# Patient Record
Sex: MALE | Race: WHITE | Employment: UNEMPLOYED | URBAN - METROPOLITAN AREA
[De-identification: names, ages, dates, MRNs, and addresses within clinical notes are randomized per-mention and may not be internally consistent; named-entity substitution may affect disease eponyms.]

---

## 2022-09-16 ENCOUNTER — HOSPITAL ENCOUNTER (EMERGENCY)
Age: 57
Discharge: HOME | End: 2022-09-16
Attending: EMERGENCY MEDICINE

## 2022-09-16 ENCOUNTER — APPOINTMENT (OUTPATIENT)
Dept: GENERAL RADIOLOGY | Age: 57
End: 2022-09-16

## 2022-09-16 VITALS
HEIGHT: 64 IN | BODY MASS INDEX: 17.93 KG/M2 | WEIGHT: 105 LBS | SYSTOLIC BLOOD PRESSURE: 113 MMHG | OXYGEN SATURATION: 93 % | TEMPERATURE: 98.6 F | DIASTOLIC BLOOD PRESSURE: 80 MMHG | HEART RATE: 78 BPM | RESPIRATION RATE: 18 BRPM

## 2022-09-16 DIAGNOSIS — J44.1 COPD EXACERBATION (HCC): Primary | ICD-10-CM

## 2022-09-16 DIAGNOSIS — R09.02 HYPOXIA: ICD-10-CM

## 2022-09-16 LAB
ALBUMIN SERPL-MCNC: 3.2 G/DL (ref 3.5–5)
ALBUMIN/GLOB SERPL: 0.8 {RATIO} (ref 1.2–3.5)
ALP SERPL-CCNC: 79 U/L (ref 50–136)
ALT SERPL-CCNC: 21 U/L (ref 12–65)
ANION GAP SERPL CALC-SCNC: 2 MMOL/L (ref 4–13)
ARTERIAL PATENCY WRIST A: POSITIVE
AST SERPL-CCNC: 10 U/L (ref 15–37)
BASE EXCESS BLD CALC-SCNC: 2.7 MMOL/L
BASOPHILS # BLD: 0 K/UL (ref 0–0.2)
BASOPHILS NFR BLD: 0 % (ref 0–2)
BDY SITE: ABNORMAL
BILIRUB SERPL-MCNC: 0.4 MG/DL (ref 0.2–1.1)
BUN SERPL-MCNC: 19 MG/DL (ref 6–23)
CALCIUM SERPL-MCNC: 9.6 MG/DL (ref 8.3–10.4)
CHLORIDE SERPL-SCNC: 102 MMOL/L (ref 101–110)
CO2 BLD-SCNC: 29 MMOL/L (ref 13–23)
CO2 SERPL-SCNC: 33 MMOL/L (ref 21–32)
CREAT SERPL-MCNC: 1 MG/DL (ref 0.8–1.5)
D DIMER PPP FEU-MCNC: 0.89 UG/ML(FEU)
DIFFERENTIAL METHOD BLD: ABNORMAL
EKG ATRIAL RATE: 94 BPM
EKG DIAGNOSIS: NORMAL
EKG P AXIS: 82 DEGREES
EKG P-R INTERVAL: 138 MS
EKG Q-T INTERVAL: 352 MS
EKG QRS DURATION: 88 MS
EKG QTC CALCULATION (BAZETT): 440 MS
EKG R AXIS: 189 DEGREES
EKG T AXIS: 78 DEGREES
EKG VENTRICULAR RATE: 94 BPM
EOSINOPHIL # BLD: 0.2 K/UL (ref 0–0.8)
EOSINOPHIL NFR BLD: 2 % (ref 0.5–7.8)
ERYTHROCYTE [DISTWIDTH] IN BLOOD BY AUTOMATED COUNT: 13.6 % (ref 11.9–14.6)
GAS FLOW.O2 O2 DELIVERY SYS: ABNORMAL L/MIN
GLOBULIN SER CALC-MCNC: 4.2 G/DL (ref 2.3–3.5)
GLUCOSE SERPL-MCNC: 108 MG/DL (ref 65–100)
HCO3 BLD-SCNC: 28.8 MMOL/L (ref 22–26)
HCT VFR BLD AUTO: 57.8 % (ref 41.1–50.3)
HGB BLD-MCNC: 19.1 G/DL (ref 13.6–17.2)
IMM GRANULOCYTES # BLD AUTO: 0.1 K/UL (ref 0–0.5)
IMM GRANULOCYTES NFR BLD AUTO: 1 % (ref 0–5)
LYMPHOCYTES # BLD: 1.7 K/UL (ref 0.5–4.6)
LYMPHOCYTES NFR BLD: 19 % (ref 13–44)
MAGNESIUM SERPL-MCNC: 2 MG/DL (ref 1.8–2.4)
MCH RBC QN AUTO: 32.9 PG (ref 26.1–32.9)
MCHC RBC AUTO-ENTMCNC: 33 G/DL (ref 31.4–35)
MCV RBC AUTO: 99.7 FL (ref 79.6–97.8)
MONOCYTES # BLD: 0.8 K/UL (ref 0.1–1.3)
MONOCYTES NFR BLD: 8 % (ref 4–12)
NEUTS SEG # BLD: 6.6 K/UL (ref 1.7–8.2)
NEUTS SEG NFR BLD: 70 % (ref 43–78)
NRBC # BLD: 0 K/UL (ref 0–0.2)
PCO2 BLD: 47.2 MMHG (ref 35–45)
PH BLD: 7.39 [PH] (ref 7.35–7.45)
PLATELET # BLD AUTO: 300 K/UL (ref 150–450)
PMV BLD AUTO: 8.9 FL (ref 9.4–12.3)
PO2 BLD: 46 MMHG (ref 75–100)
POTASSIUM SERPL-SCNC: 3.8 MMOL/L (ref 3.5–5.1)
PROT SERPL-MCNC: 7.4 G/DL (ref 6.3–8.2)
RBC # BLD AUTO: 5.8 M/UL (ref 4.23–5.6)
SAO2 % BLD: 80.4 % (ref 95–98)
SERVICE CMNT-IMP: ABNORMAL
SODIUM SERPL-SCNC: 137 MMOL/L (ref 138–145)
SPECIMEN TYPE: ABNORMAL
WBC # BLD AUTO: 9.3 K/UL (ref 4.3–11.1)

## 2022-09-16 PROCEDURE — 96374 THER/PROPH/DIAG INJ IV PUSH: CPT

## 2022-09-16 PROCEDURE — 83735 ASSAY OF MAGNESIUM: CPT

## 2022-09-16 PROCEDURE — 71045 X-RAY EXAM CHEST 1 VIEW: CPT

## 2022-09-16 PROCEDURE — 6370000000 HC RX 637 (ALT 250 FOR IP): Performed by: EMERGENCY MEDICINE

## 2022-09-16 PROCEDURE — 85025 COMPLETE CBC W/AUTO DIFF WBC: CPT

## 2022-09-16 PROCEDURE — 36600 WITHDRAWAL OF ARTERIAL BLOOD: CPT

## 2022-09-16 PROCEDURE — 80053 COMPREHEN METABOLIC PANEL: CPT

## 2022-09-16 PROCEDURE — 6360000002 HC RX W HCPCS: Performed by: EMERGENCY MEDICINE

## 2022-09-16 PROCEDURE — 82803 BLOOD GASES ANY COMBINATION: CPT

## 2022-09-16 PROCEDURE — 93005 ELECTROCARDIOGRAM TRACING: CPT | Performed by: EMERGENCY MEDICINE

## 2022-09-16 PROCEDURE — 99285 EMERGENCY DEPT VISIT HI MDM: CPT

## 2022-09-16 PROCEDURE — 85379 FIBRIN DEGRADATION QUANT: CPT

## 2022-09-16 RX ORDER — METHYLPREDNISOLONE SODIUM SUCCINATE 125 MG/2ML
125 INJECTION, POWDER, LYOPHILIZED, FOR SOLUTION INTRAMUSCULAR; INTRAVENOUS
Status: COMPLETED | OUTPATIENT
Start: 2022-09-16 | End: 2022-09-16

## 2022-09-16 RX ORDER — PREDNISONE 20 MG/1
20 TABLET ORAL 2 TIMES DAILY
Qty: 10 TABLET | Refills: 0 | Status: SHIPPED | OUTPATIENT
Start: 2022-09-16 | End: 2022-09-21

## 2022-09-16 RX ORDER — SODIUM CHLORIDE 0.9 % (FLUSH) 0.9 %
5 SYRINGE (ML) INJECTION EVERY 8 HOURS
Status: DISCONTINUED | OUTPATIENT
Start: 2022-09-16 | End: 2022-09-16 | Stop reason: HOSPADM

## 2022-09-16 RX ORDER — IPRATROPIUM BROMIDE AND ALBUTEROL SULFATE 2.5; .5 MG/3ML; MG/3ML
1 SOLUTION RESPIRATORY (INHALATION)
Status: COMPLETED | OUTPATIENT
Start: 2022-09-16 | End: 2022-09-16

## 2022-09-16 RX ADMIN — IPRATROPIUM BROMIDE AND ALBUTEROL SULFATE 1 AMPULE: .5; 3 SOLUTION RESPIRATORY (INHALATION) at 15:46

## 2022-09-16 RX ADMIN — METHYLPREDNISOLONE SODIUM SUCCINATE 125 MG: 125 INJECTION, POWDER, FOR SOLUTION INTRAMUSCULAR; INTRAVENOUS at 15:28

## 2022-09-16 ASSESSMENT — PAIN SCALES - GENERAL
PAINLEVEL_OUTOF10: 0
PAINLEVEL_OUTOF10: 0

## 2022-09-16 ASSESSMENT — ENCOUNTER SYMPTOMS
WHEEZING: 1
COUGH: 0
SHORTNESS OF BREATH: 1

## 2022-09-16 ASSESSMENT — PAIN - FUNCTIONAL ASSESSMENT
PAIN_FUNCTIONAL_ASSESSMENT: 0-10
PAIN_FUNCTIONAL_ASSESSMENT: 0-10

## 2022-09-16 NOTE — ED PROVIDER NOTES
Emergency Department Provider Note                   PCP:                Wander Gerber MD               Age: 62 y.o. Sex: male       ICD-10-CM    1. COPD exacerbation ([facility]  J44.1       2. Hypoxia  R09.02           DISPOSITION Decision To Discharge 2022 05:55:41 PM        MDM  Number of Diagnoses or Management Options  COPD exacerbation ([facility]): new, needed workup  Hypoxia: new, needed workup     Amount and/or Complexity of Data Reviewed  Clinical lab tests: ordered and reviewed  Tests in the radiology section of CPT®: ordered and reviewed  Obtain history from someone other than the patient: yes  Review and summarize past medical records: yes (Excerpt from patient's last discharge summary:  Discharge Summaries  - documented in this encounter  Cherise Benites MD - 2022 1:13 PM EDT  Formatting of this note is different from the original.    Hospitalist Discharge Summary    Division of [address]  Phone: [phone] Fax: [phone]  Date/Time: 22 - 1:13 PM   Admitted: 2022   Discharged: 22   Total LOS: 0   Admitting MD: No admitting provider for patient encounter. Discharging MD: Maty Morfin MD       Demographics     Patient: Pablo Wall : 1965 MRN: [id number]     PCP: Liz Ramos MD   PCP Phone: [phone]   Code Status: Full Code   Readmit Risk: LACE+ Score: 55 Diet: Diet Heart Healthy         Follow Up   Scheduled Follow Up:  No future appointments. Pending studies:   none    Discharge Diagnoses   Principal Problem:  COPD with acute exacerbation ([facility] POA: Yes  Active Problems:  Essential hypertension POA: Yes  Generalized anxiety disorder POA: Yes  Facial droop POA: Yes  Resolved Problems:  * No resolved hospital problems.  [facility] Course   Presenting Problem:   Chief Complaint   Patient presents with   Shortness of Breath   Hearing Problem   Difficulty Walking History of Present Illness: As per Dr Ellis Marks from 9/13/2022  62 y.o. male who presents with shortness of breath. He has known COPD but does not use oxygen at home. His wife is at bedside and helps to contribute to the history. He had oxygen in the past but wife reports it was never renewed. They presented to the ED because he began to be short of breath today despite using his rescue inhalers and nebulizer so he presented to the ED. Wife also notes that Saturday he had a right facial droop and could not hear. By Sunday he could hear better but the right facial droop has been persistent since Saturday. Wife notes that his glucose was 169 on Saturday and on Sunday it was 5 and he is not a diabetic. In the ED CT head showed no acute stroke. Received an hour-long nebulizer treatment, azithromycin, duo nebs, and Solu-Medrol. A NicoDerm patch was also ordered by the ED provider. Patient had been reluctant to be admitted but now is reluctantly agreeable to admission so hospitalist were contacted to admit the patient. History from the patient is limited b/c he does not want to be admitted but has agreed to admission at the encouragement of his family. Chest pain, visual changes, nausea, or vomiting. He reports he has had a mild headache. Hospital Course:     Patient was admitted and following problems were addressed. Acute copd exacerbation-  Patient was started on steroids along with doxycycline. During the hospital stay symptoms improved and he was discharged on oral steroids along with a course of doxycycline. He was not requiring oxygen at the time of discharge    Right facial droop - resolved  MRI brain with Chronic small vessel ischemic lesions  MRI of the brain was negative for acute CVA. Did show Multiple high FLAIR and T2 signal observations in the subcortical and periventricular white matter without enhancement compatible with chronic small vessel ischemia.  Pt was counseled regarding the importance of quitting smoking to prevent any strokes in the future. Family requested Neurology referral for these lesions, which was provided to them on DC paperwork  Right facial droop had also resolved during his stay. Patient did not have any neurological deficits at the time of discharge    Hypertension, essential  Since symptoms were Saturday (over 48 hrs ago) will resume antihypertensive meds for now   )    Risk of Complications, Morbidity, and/or Mortality  Presenting problems: moderate  Diagnostic procedures: moderate  Management options: moderate  General comments: Patient was seen by case management. Given options for obtaining oxygen at home as he has had it there before. Patient currently has access to a oxygen concentrator, and would like to go home. Patient be discharged home on a short course of prednisone, as he has had no fever or significant cough I do not believe antibiotics are indicated at this time. Patient was instructed to wear his oxygen at 3 L at all time, consider home oxygen sat probe. Patient instructed follow-up with his family doctor next week. Patient Progress  Patient progress: stable       ED Course as of 09/16/22 1756   Fri Sep 16, 2022   1558 ABG consistent with hypoxia and chronic CO2 retention with metabolic compensation. Patient feeling much better on oxygen and after breathing treatment.  [BB]      ED Course User Index  [BB] Aaron Golden MD        Orders Placed This Encounter   Procedures    XR CHEST PORTABLE    CBC with Auto Differential    Comprehensive Metabolic Panel    Magnesium    D-Dimer, Quantitative    Blood Gas, Arterial    Cardiac Monitor - ED Only    Continuous Pulse Oximetry    POC Blood, Cord, Arterial    EKG 12 Lead    Saline lock IV        Medications   sodium chloride flush 0.9 % injection 5 mL (5 mLs IntraVENous Not Given 9/16/22 1754)   ipratropium-albuterol (DUONEB) nebulizer solution 1 ampule (1 ampule Inhalation Given 9/16/22 1546) methylPREDNISolone sodium (SOLU-MEDROL) injection 125 mg (125 mg IntraVENous Given 9/16/22 2871)       New Prescriptions    PREDNISONE (DELTASONE) 20 MG TABLET    Take 1 tablet by mouth 2 times daily for 5 days        Landry Denis is a 62 y.o. male who presents to the Emergency Department with chief complaint of    Chief Complaint   Patient presents with    Shortness of Breath      77-year-old male with history of COPD presents to the emergency department complaining of low oxygen saturations at home, difficulty with focus and concentration, and just does not feel right. Patient was discharged from [address] yesterday, after being admitted for TIA work-up as well as COPD exacerbation requiring oxygen supplementation. Patient and spouse state that he was supposed to be sent home with steroids as well as antibiotics, neither of which were called into the pharmacy. Throughout the night and today the patient's oxygen saturation has been between 81 and 89% at home, but patient states his wheezing is significantly improved. He denies any lower extremity edema, PND or orthopnea. He denies any headache or visual changes. He does state that he woke up Saturday morning with the inability to hear, and wife also noted a facial droop which resolved at time of evaluation at [facility].  Work-up negative for acute stroke and apparently the patient was not in need of oxygen when he was discharged although patient and spouse state he was on oxygen up until time of discharge. The history is provided by the patient and the spouse. Review of Systems   Constitutional:  Negative for chills, diaphoresis and fever. Respiratory:  Positive for shortness of breath and wheezing. Negative for cough. Cardiovascular:  Negative for chest pain, palpitations and leg swelling. Psychiatric/Behavioral:  Positive for decreased concentration. Negative for sleep disturbance and suicidal ideas.  The patient is nervous/anxious. All other systems reviewed and are negative. Past Medical History:   Diagnosis Date    COPD (chronic obstructive pulmonary disease) ([facility]     Hyperlipidemia     Hypertension         History reviewed. No pertinent surgical history. History reviewed. No pertinent family history. Social History     Socioeconomic History    Marital status:      Spouse name: None    Number of children: None    Years of education: None    Highest education level: None   Tobacco Use    Smoking status: Every Day     Types: Cigarettes    Smokeless tobacco: Never   Substance and Sexual Activity    Alcohol use: Not Currently         Patient has no known allergies. Previous Medications    No medications on file        Vitals signs and nursing note reviewed. Patient Vitals for the past 4 hrs:   Pulse Resp BP SpO2   09/16/22 1743 77 20 122/77 93 %   09/16/22 1728 78 18 107/83 94 %   09/16/22 1613 81 22 (!) 143/81 93 %   09/16/22 1558 73 19 (!) 117/57 93 %   09/16/22 1548 78 14 -- 90 %   09/16/22 1546 75 16 -- (!) 86 %   09/16/22 1528 78 20 -- (!) 86 %   09/16/22 1400 85 22 120/81 91 %          Physical Exam  Vitals and nursing note reviewed. Constitutional:       General: He is not in acute distress. HENT:      Head: Normocephalic and atraumatic. Right Ear: External ear normal.      Left Ear: External ear normal.      Nose: Nose normal.      Mouth/Throat:      Mouth: Mucous membranes are moist.   Eyes:      Extraocular Movements: Extraocular movements intact. Conjunctiva/sclera: Conjunctivae normal.      Pupils: Pupils are equal, round, and reactive to light. Cardiovascular:      Rate and Rhythm: Normal rate and regular rhythm. Heart sounds: No murmur heard. Pulmonary:      Effort: Pulmonary effort is normal.      Breath sounds: Wheezing (Mild throughout) present. Abdominal:      General: Bowel sounds are normal.      Palpations: Abdomen is soft. Tenderness:  There is no abdominal tenderness. Musculoskeletal:         General: Normal range of motion. Cervical back: Normal range of motion and neck supple. Skin:     General: Skin is warm and dry. Neurological:      General: No focal deficit present. Mental Status: He is alert and oriented to person, place, and time. Psychiatric:         Mood and Affect: Mood normal.         Behavior: Behavior normal.        Procedures    Results for orders placed or performed during the hospital encounter of 09/16/22   XR CHEST PORTABLE    Narrative    XR CHEST PORTABLE 9/16/2022 1:48 PM    HISTORY: Shortness of breath. COMPARISON: None available. A portable AP view of the chest was obtained. Impression    Lungs appear hyperinflated but otherwise clear possibly reflecting  COPD. No infiltrate or consolidation. The heart is normal in size. No  pneumothorax. No pleural effusions.      CBC with Auto Differential   Result Value Ref Range    WBC 9.3 4.3 - 11.1 K/uL    RBC 5.80 (H) 4.23 - 5.6 M/uL    Hemoglobin 19.1 (H) 13.6 - 17.2 g/dL    Hematocrit 57.8 (H) 41.1 - 50.3 %    MCV 99.7 (H) 79.6 - 97.8 FL    MCH 32.9 26.1 - 32.9 PG    MCHC 33.0 31.4 - 35.0 g/dL    RDW 13.6 11.9 - 14.6 %    Platelets 000 509 - 421 K/uL    MPV 8.9 (L) 9.4 - 12.3 FL    nRBC 0.00 0.0 - 0.2 K/uL    Differential Type AUTOMATED      Seg Neutrophils 70 43 - 78 %    Lymphocytes 19 13 - 44 %    Monocytes 8 4.0 - 12.0 %    Eosinophils % 2 0.5 - 7.8 %    Basophils 0 0.0 - 2.0 %    Immature Granulocytes 1 0.0 - 5.0 %    Segs Absolute 6.6 1.7 - 8.2 K/UL    Absolute Lymph # 1.7 0.5 - 4.6 K/UL    Absolute Mono # 0.8 0.1 - 1.3 K/UL    Absolute Eos # 0.2 0.0 - 0.8 K/UL    Basophils Absolute 0.0 0.0 - 0.2 K/UL    Absolute Immature Granulocyte 0.1 0.0 - 0.5 K/UL   Comprehensive Metabolic Panel   Result Value Ref Range    Sodium 137 (L) 138 - 145 mmol/L    Potassium 3.8 3.5 - 5.1 mmol/L    Chloride 102 101 - 110 mmol/L    CO2 33 (H) 21 - 32 mmol/L    Anion Gap 2 (L) 4 - 13 mmol/L    Glucose 108 (H) 65 - 100 mg/dL    BUN 19 6 - 23 MG/DL    Creatinine 1.00 0.8 - 1.5 MG/DL    GFR African American >60 >60 ml/min/1.73m2    GFR Non- >60 >60 ml/min/1.73m2    Calcium 9.6 8.3 - 10.4 MG/DL    Total Bilirubin 0.4 0.2 - 1.1 MG/DL    ALT 21 12 - 65 U/L    AST 10 (L) 15 - 37 U/L    Alk Phosphatase 79 50 - 136 U/L    Total Protein 7.4 6.3 - 8.2 g/dL    Albumin 3.2 (L) 3.5 - 5.0 g/dL    Globulin 4.2 (H) 2.3 - 3.5 g/dL    Albumin/Globulin Ratio 0.8 (L) 1.2 - 3.5     Magnesium   Result Value Ref Range    Magnesium 2.0 1.8 - 2.4 mg/dL   D-Dimer, Quantitative   Result Value Ref Range    D-Dimer, Quant 0.89 (H) <0.56 ug/ml(FEU)   POC Blood, Cord, Arterial   Result Value Ref Range    DEVICE ROOM AIR      pH, Arterial, POC 7.39 7.35 - 7.45      pCO2, Arterial, POC 47.2 (H) 35 - 45 MMHG    pO2, Arterial, POC 46 (L) 75 - 100 MMHG    HCO3, Mixed 28.8 (H) 22 - 26 MMOL/L    SO2c, Arterial, POC 80.4 (L) 95 - 98 %    Base Excess 2.7 mmol/L    POC Catracho's Test Positive      Site RIGHT RADIAL      Specimen type: ARTERIAL      Performed by: Jonny     POC TCO2 29 (H) 13 - 23 MMOL/L   EKG 12 Lead   Result Value Ref Range    Ventricular Rate 94 BPM    Atrial Rate 94 BPM    P-R Interval 138 ms    QRS Duration 88 ms    Q-T Interval 352 ms    QTc Calculation (Bazett) 440 ms    P Axis 82 degrees    R Axis 189 degrees    T Axis 78 degrees    Diagnosis Normal sinus rhythm         XR CHEST PORTABLE   Final Result   Lungs appear hyperinflated but otherwise clear possibly reflecting   COPD. No infiltrate or consolidation. The heart is normal in size. No   pneumothorax. No pleural effusions. Voice dictation software was used during the making of this note. This software is not perfect and grammatical and other typographical errors may be present. This note has not been completely proofread for errors.      Merlin Ohs, MD  09/16/22 [id number]